# Patient Record
Sex: FEMALE | URBAN - METROPOLITAN AREA
[De-identification: names, ages, dates, MRNs, and addresses within clinical notes are randomized per-mention and may not be internally consistent; named-entity substitution may affect disease eponyms.]

---

## 2023-08-23 ENCOUNTER — APPOINTMENT (RX ONLY)
Dept: URBAN - METROPOLITAN AREA CLINIC 33 | Facility: CLINIC | Age: 73
Setting detail: DERMATOLOGY
End: 2023-08-23

## 2023-08-23 VITALS
SYSTOLIC BLOOD PRESSURE: 140 MMHG | DIASTOLIC BLOOD PRESSURE: 85 MMHG | WEIGHT: 118 LBS | HEART RATE: 62 BPM | HEIGHT: 62 IN

## 2023-08-23 DIAGNOSIS — L71.8 OTHER ROSACEA: ICD-10-CM | Status: WORSENING

## 2023-08-23 PROCEDURE — 99204 OFFICE O/P NEW MOD 45 MIN: CPT

## 2023-08-23 PROCEDURE — ? PRESCRIPTION

## 2023-08-23 PROCEDURE — ? COUNSELING

## 2023-08-23 RX ORDER — AZELAIC ACID 0.15 G/G
"1/2"" STRIP (1/2 GM)" GEL TOPICAL QAM
Qty: 50 | Refills: 3 | Status: ERX | COMMUNITY
Start: 2023-08-23

## 2023-08-23 RX ADMIN — AZELAIC ACID "1/2"" STRIP (1/2 GM)": 0.15 GEL TOPICAL at 00:00

## 2023-08-23 ASSESSMENT — LOCATION SIMPLE DESCRIPTION DERM
LOCATION SIMPLE: RIGHT CHEEK
LOCATION SIMPLE: LEFT CHEEK
LOCATION SIMPLE: NOSE

## 2023-08-23 ASSESSMENT — LOCATION DETAILED DESCRIPTION DERM
LOCATION DETAILED: LEFT INFERIOR CENTRAL MALAR CHEEK
LOCATION DETAILED: RIGHT CENTRAL MALAR CHEEK
LOCATION DETAILED: NASAL DORSUM

## 2023-08-23 ASSESSMENT — LOCATION ZONE DERM
LOCATION ZONE: NOSE
LOCATION ZONE: FACE

## 2023-08-23 NOTE — PROCEDURE: COUNSELING
Detail Level: Simple
Patient Specific Counseling (Will Not Stick From Patient To Patient): We discussed pathogenesis and decided on a treatment plan.  She is concerned about developing \"bulbous\" nose but notes that she has always had a larger nose.  Do not find those features today but has quite a vascular network on the nose which we discussed in light of Mirvaso and Rhofade however they are more expensive.  After discussion elected following plan:\\n\\n1.  Gentle wash to the face 2 x daily - cool water use hands . She uses Clinique gentle products\\n2.  Azelex pea size - 1/2\" strip to the face 2 x daily  - treat full face.  If it stings use a few days just 1 x daily in the AM then increase to 2 x daily when stinging stops\\n3 .Light moisturizer- recommend 1 - 2 pumps Niacinamide serum in it- such as [employer] or found at [facility] - this helps with inflammation on the skin.\\n4.  Sun protect\\n5.  Manage Triggers\\n\\nConsistency over time - we will re-evaluate in 3 months . She notes she really doesn't get \"pimple\" like eruptions but can be quite red at times . We will revisit possibility of Mirvaso or Rhofade at that time if she wishes.  Call if questions or concerns.
Cleanser Recommendations: 1.  CeraVe Hydrating Wash\\n2.  Cetaphil Gentle Cleanser\\n3.  Neutrogena Ultra Gentle Face Wash\\n4.  DHC Olive Oil Cleanser (found online [email or web address]) \\n5.  Dove\\n6.  Vanicare Gentle Facial Cleanser
Moisturizer Recommendations: Some recommended moisturizers that have Niacinamide/Anti-inflammatory\\n1.  CeraVe PM Lotion - using it 2 x daily *Niacinamide\\n2.  Niacinamide Serums - [employer] (online) has some niacinamide moisturizers and serums\\n3.  Vanicare Light moisturizer  \\n4.  Neutrogena Gel moisturizers
Sunscreen Recommendations: Mostly recommend any product one is willing to use- quantity and frequency of every 2 hours when out provides optimal results. \\nOver the Counter -some ideas - \\n1.  Neutrogena Products\\n2 . Elta MD\\n3.  Powdered Sunscreens- recommend also checking [employer] [facility]\\n    *Colorescience Sunforgettable Mineral\\n    *Brush on Block\\n4.  CeraVe

## 2023-12-04 ENCOUNTER — APPOINTMENT (RX ONLY)
Dept: URBAN - METROPOLITAN AREA CLINIC 33 | Facility: CLINIC | Age: 73
Setting detail: DERMATOLOGY
End: 2023-12-04

## 2023-12-04 VITALS
HEART RATE: 57 BPM | SYSTOLIC BLOOD PRESSURE: 130 MMHG | HEIGHT: 62 IN | WEIGHT: 118 LBS | DIASTOLIC BLOOD PRESSURE: 79 MMHG

## 2023-12-04 DIAGNOSIS — L71.8 OTHER ROSACEA: ICD-10-CM | Status: IMPROVED

## 2023-12-04 DIAGNOSIS — D22 MELANOCYTIC NEVI: ICD-10-CM

## 2023-12-04 PROBLEM — D22.0 MELANOCYTIC NEVI OF LIP: Status: ACTIVE | Noted: 2023-12-04

## 2023-12-04 PROBLEM — D22.39 MELANOCYTIC NEVI OF OTHER PARTS OF FACE: Status: ACTIVE | Noted: 2023-12-04

## 2023-12-04 PROCEDURE — 99213 OFFICE O/P EST LOW 20 MIN: CPT

## 2023-12-04 PROCEDURE — ? COUNSELING

## 2023-12-04 ASSESSMENT — LOCATION SIMPLE DESCRIPTION DERM
LOCATION SIMPLE: RIGHT LIP
LOCATION SIMPLE: LEFT CHEEK
LOCATION SIMPLE: NOSE
LOCATION SIMPLE: RIGHT CHEEK

## 2023-12-04 ASSESSMENT — LOCATION ZONE DERM
LOCATION ZONE: FACE
LOCATION ZONE: NOSE
LOCATION ZONE: LIP

## 2023-12-04 ASSESSMENT — LOCATION DETAILED DESCRIPTION DERM
LOCATION DETAILED: LEFT CENTRAL MALAR CHEEK
LOCATION DETAILED: RIGHT CENTRAL MALAR CHEEK
LOCATION DETAILED: NASAL DORSUM
LOCATION DETAILED: RIGHT UPPER CUTANEOUS LIP
LOCATION DETAILED: LEFT INFERIOR CENTRAL MALAR CHEEK

## 2023-12-04 NOTE — PROCEDURE: COUNSELING
Detail Level: Simple
Patient Specific Counseling (Will Not Stick From Patient To Patient): Patient has improved considerably- and she is pleased. That said the Azelex- is only being used 1 x daily.  Encouraged her to work on increasing. She is tuning in to her triggers- \"heat\" - and \"cold\"- primarily.  We talked about management.  She has used Clinique in thepast- however picked up some CeraVe and liked the lotions. Recommend CeraVe PM - as noted in information provided last visit- and she may like the CeraVe washes- samples provided.\\n\\nWe will plan on seeing her back in 6 months. She is aware that this can wax and wane.  Stressors, and other things can cause flares.  Any problems return earlier.\\n\\nPREVIOUS NOTE:\\nWe discussed pathogenesis and decided on a treatment plan.  She is concerned about developing \"bulbous\" nose but notes that she has always had a larger nose.  Do not find those features today but has quite a vascular network on the nose which we discussed in light of Mirvaso and Rhofade however they are more expensive.  After discussion elected following plan:\\n\\n1.  Gentle wash to the face 2 x daily - cool water use hands . She uses [employer] gentle products\\n2.  Azelex pea size - 1/2\" strip to the face 2 x daily  - treat full face.  If it stings use a few days just 1 x daily in the AM then increase to 2 x daily when stinging stops\\n3 .Light moisturizer- recommend 1 - 2 pumps Niacinamide serum in it- such as [employer] or found at [facility] - this helps with inflammation on the skin.\\n4.  Sun protect\\n5.  Manage Triggers\\n\\nConsistency over time - we will re-evaluate in 3 months . She notes she really doesn't get \"pimple\" like eruptions but can be quite red at times . We will revisit possibility of Mirvaso or Rhofade at that time if she wishes.  Call if questions or concerns.
Cleanser Recommendations: 1.  CeraVe Hydrating Wash\\n2.  Cetaphil Gentle Cleanser\\n3.  Neutrogena Ultra Gentle Face Wash\\n4.  DHC Olive Oil Cleanser (found online [email or web address]) \\n5.  Dove\\n6.  Vanicare Gentle Facial Cleanser
Moisturizer Recommendations: Some recommended moisturizers that have Niacinamide/Anti-inflammatory\\n1.  CeraVe PM Lotion - using it 2 x daily *Niacinamide\\n2.  Niacinamide Serums - [employer]'s (online) has some niacinamide moisturizers and serums\\n3.  Vanicare Light moisturizer  \\n4.  Neutrogena Gel moisturizers
Sunscreen Recommendations: Mostly recommend any product one is willing to use- quantity and frequency of every 2 hours when out provides optimal results. \\nOver the Counter -some ideas - \\n1.  Neutrogena Products\\n2 . Elta MD\\n3.  Powdered Sunscreens- recommend also checking [employer] [facility]\\n    *Colorescience Sunforgettable Mineral\\n    *Brush on Block\\n4.  CeraVe
Sunscreen Recommendations: [id number]+ reapply every 2 hours when out